# Patient Record
Sex: FEMALE | Race: WHITE | URBAN - METROPOLITAN AREA
[De-identification: names, ages, dates, MRNs, and addresses within clinical notes are randomized per-mention and may not be internally consistent; named-entity substitution may affect disease eponyms.]

---

## 2023-04-25 ENCOUNTER — TELEPHONE (OUTPATIENT)
Dept: CARDIOLOGY CLINIC | Age: 68
End: 2023-04-25

## 2023-04-25 DIAGNOSIS — I35.1 NONRHEUMATIC AORTIC (VALVE) INSUFFICIENCY: Primary | ICD-10-CM

## 2023-04-25 NOTE — TELEPHONE ENCOUNTER
Patient states she has an annual appt scheduled for 5/25. Patient states this was the year to have an Echo. Patient states while at her internist, he picked up something in one of her Carotids. Please call and advise patient when the orders are in.

## 2023-05-31 NOTE — PROGRESS NOTES
[address] [address] [address], [address]  PHONE: [phone]      23    NAME:  Ann Ackerman  : 1955  MRN: [id number]         SUBJECTIVE:   Ann Ackerman is a 70 y.o. female seen for a follow up visit regarding the following:     Chief Complaint   Patient presents with    Follow-up    Hypertension            HPI:  Follow up  Follow-up and Hypertension   . Follow up AI, bradycardia, hypertension, dyslipidemia and statin intolerance. Kindly brought her labs which appear below, LDL remains 160. She exercises daily, a little short of breath up inclines but not enough that it has caused concern at this point, no chest tightness, dizziness, near syncope or edema. 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 Aortic root 4.0cm (increased from 3.7 on previous study) Mild MR and TR. RVSP 31. 2014 : normal V size, EF 50-55%. Mild AI, aortic root 3.9, mild MR and TR, RVSP 32    2015 : LVEDS 38 LVEDD 56 EF 58%, moderate AI, mild MR, RVSP 36    Aug 2016 - EF 65%, mod to severe AI by report - reviewed images, P1/2 time 599, normal chamber size (49 and 34 mm), moderate    2017 - EF 65-70%, mild to mod AI, ascending aorta 4.3 cm, mild MR    2018 - EF 60%, LVEDD 59, moderate AI, ascending aorta 3.7    2019 - no change   2020- EF 60%, abnormal DF, mod AI, ascending aorta 4.1 cm  May 2023       EF 60-65%, mild LVE, normal DF, Mod AI, mild AS, mean gradient 13, peak velocity 2.5 m/s, SVI 67, mild MR, RVSP 23, aortic root 4.3, ascending Ao 3.8, DTA 2.8         Slow pulse, rhythm disorder, bradycardia, SVT   7/3/13 : Holter with sinus bradycardia, average heart rate 48, rates in the 30s during sleep with 2 second pauses, asymptomatic                   Key CAD CHF Meds            Bempedoic Acid 180 MG TABS (Taking)    Sig - Route:  Take 180 mg by mouth daily -

## 2023-06-01 ENCOUNTER — OFFICE VISIT (OUTPATIENT)
Age: 68
End: 2023-06-01

## 2023-06-01 VITALS
HEART RATE: 50 BPM | HEIGHT: 64 IN | BODY MASS INDEX: 27.14 KG/M2 | WEIGHT: 159 LBS | DIASTOLIC BLOOD PRESSURE: 72 MMHG | SYSTOLIC BLOOD PRESSURE: 130 MMHG

## 2023-06-01 DIAGNOSIS — R00.1 BRADYCARDIA: ICD-10-CM

## 2023-06-01 DIAGNOSIS — I10 ESSENTIAL HYPERTENSION: Primary | ICD-10-CM

## 2023-06-01 DIAGNOSIS — I35.1 NONRHEUMATIC AORTIC VALVE INSUFFICIENCY: ICD-10-CM

## 2023-06-01 DIAGNOSIS — E78.2 MIXED HYPERLIPIDEMIA: ICD-10-CM

## 2023-06-01 DIAGNOSIS — R09.89 BILATERAL CAROTID BRUITS: ICD-10-CM

## 2023-06-01 RX ORDER — OMEPRAZOLE 40 MG/1
CAPSULE, DELAYED RELEASE ORAL DAILY
COMMUNITY
Start: 2023-05-28

## 2023-06-01 RX ORDER — CHLORTHALIDONE 25 MG/1
25 TABLET ORAL DAILY
Qty: 30 TABLET | Status: CANCELLED | OUTPATIENT
Start: 2023-06-01

## 2023-06-01 ASSESSMENT — ENCOUNTER SYMPTOMS: SHORTNESS OF BREATH: 0

## 2023-06-07 ENCOUNTER — PATIENT MESSAGE (OUTPATIENT)
Age: 68
End: 2023-06-07

## 2023-06-07 NOTE — TELEPHONE ENCOUNTER
From: Lucia Show  To: Dr. Claudean Carry: 6/7/2023 7:07 AM EDT  Subject: RX    Good morning,  Can you please send in the RX for bempedoic acid as discussed with Dr. Radha Alvarado on my recent visit. I checked with my pharmacy and the only way they can tell if insurance will cover it is if they have a prescription for it. Thank you very much.   [address] [place] [address] [phone]

## 2023-06-08 RX ORDER — LISINOPRIL 20 MG/1
20 TABLET ORAL DAILY
Qty: 90 TABLET | Refills: 3 | Status: SHIPPED | OUTPATIENT
Start: 2023-06-08

## 2023-06-08 RX ORDER — CHLORTHALIDONE 25 MG/1
25 TABLET ORAL DAILY
Qty: 90 TABLET | Refills: 3 | Status: SHIPPED | OUTPATIENT
Start: 2023-06-08

## 2023-06-19 ENCOUNTER — TELEPHONE (OUTPATIENT)
Age: 68
End: 2023-06-19

## 2023-06-20 NOTE — TELEPHONE ENCOUNTER
Correct directions faxed to local pharmacy. I tried to call but the pharmacy is not open yet.    Requested Prescriptions     Signed Prescriptions Disp Refills    Evolocumab 140 MG/ML SOAJ 6 Adjustable Dose Pre-filled Pen Syringe 3     Sig: Inject 1 mL into the skin every 14 days     Authorizing Provider: Deisi Jackson     Ordering User: Tyrel Najera

## 2024-02-25 ENCOUNTER — PATIENT MESSAGE (OUTPATIENT)
Age: 69
End: 2024-02-25

## 2024-02-26 ENCOUNTER — TELEPHONE (OUTPATIENT)
Age: 69
End: 2024-02-26

## 2024-02-26 NOTE — TELEPHONE ENCOUNTER
Requested Prescriptions     Pending Prescriptions Disp Refills    Evolocumab 140 MG/ML SOAJ 6 Adjustable Dose Pre-filled Pen Syringe 3     Sig: Inject 1 mL into the skin every 14 days

## 2024-02-27 NOTE — TELEPHONE ENCOUNTER
PA for Repatha submitted and approved through February 26, 2025.     [facility] (Key: [id number])  Rx #: [id number]  Repatha SureClick 140MG/ML auto-injectors

## 2024-06-10 NOTE — PROGRESS NOTES
[facility] CARDIOLOGY  [address] [place] [address], [address]  [place] [address]  PHONE: [phone]      24    NAME:  Mandeep Wick  : 1955  MRN: [id number]         SUBJECTIVE:   Mandeep Wick is a 69 y.o. female seen for a follow up visit regarding the following:     Chief Complaint   Patient presents with    1 Year Follow Up    Hypertension            HPI:  Follow up  1 Year Follow Up and Hypertension   .    Follow up AI, bradycardia, hypertension, dyslipidemia and statin intolerance.  She's felt well, denies cp, dyspnea.  Not walking as much as she had been because her  has been ill, so she's still very active, she's now responsible for everything at home including all the yard work.  (He has heart disease with prior surgery).          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 Aortic root 4.0cm (increased from 3.7 on previous study) Mild MR and TR. RVSP 31.    2014 : normal V size, EF 50-55%. Mild AI, aortic root 3.9, mild MR and TR, RVSP 32    2015 : LVEDS 38 LVEDD 56 EF 58%, moderate AI, mild MR, RVSP 36    Aug 2016 - EF 65%, mod to severe AI by report - reviewed images, P1/2 time 599, normal chamber size (49 and 34 mm), moderate    2017 - EF 65-70%, mild to mod AI, ascending aorta 4.3 cm, mild MR    2018 - EF 60%, LVEDD 59, moderate AI, ascending aorta 3.7    2019 - no change   2020- EF 60%, abnormal DF, mod AI, ascending aorta 4.1 cm  May 2023       EF 60-65%, mild LVE, normal DF, Mod AI, mild AS, mean gradient 13, peak velocity 2.5 m/s, SVI 67, mild MR, RVSP 23, aortic root 4.3, ascending Ao 3.8, DTA 2.8         Slow pulse, rhythm disorder, bradycardia, SVT   7/3/13 : Holter with sinus bradycardia, average heart rate 48, rates in the 30s during sleep with 2 second pauses, asymptomatic                  Cardiac Medications       ACE Inhibitors       lisinopril (PRINIVIL;ZESTRIL) 20

## 2024-06-11 ENCOUNTER — OFFICE VISIT (OUTPATIENT)
Age: 69
End: 2024-06-11
Payer: MEDICARE

## 2024-06-11 VITALS
DIASTOLIC BLOOD PRESSURE: 90 MMHG | SYSTOLIC BLOOD PRESSURE: 138 MMHG | WEIGHT: 168 LBS | BODY MASS INDEX: 28.68 KG/M2 | HEIGHT: 64 IN | HEART RATE: 47 BPM

## 2024-06-11 DIAGNOSIS — R00.1 BRADYCARDIA: ICD-10-CM

## 2024-06-11 DIAGNOSIS — I10 ESSENTIAL HYPERTENSION: Primary | ICD-10-CM

## 2024-06-11 DIAGNOSIS — I06.1 RHEUMATIC AORTIC VALVE INSUFFICIENCY: ICD-10-CM

## 2024-06-11 DIAGNOSIS — E78.2 MIXED HYPERLIPIDEMIA: ICD-10-CM

## 2024-06-11 PROCEDURE — G8419 CALC BMI OUT NRM PARAM NOF/U: HCPCS | Performed by: INTERNAL MEDICINE

## 2024-06-11 PROCEDURE — 1036F TOBACCO NON-USER: CPT | Performed by: INTERNAL MEDICINE

## 2024-06-11 PROCEDURE — 3079F DIAST BP 80-89 MM HG: CPT | Performed by: INTERNAL MEDICINE

## 2024-06-11 PROCEDURE — 3075F SYST BP GE 130 - 139MM HG: CPT | Performed by: INTERNAL MEDICINE

## 2024-06-11 PROCEDURE — 99214 OFFICE O/P EST MOD 30 MIN: CPT | Performed by: INTERNAL MEDICINE

## 2024-06-11 PROCEDURE — 1123F ACP DISCUSS/DSCN MKR DOCD: CPT | Performed by: INTERNAL MEDICINE

## 2024-06-11 PROCEDURE — 93000 ELECTROCARDIOGRAM COMPLETE: CPT | Performed by: INTERNAL MEDICINE

## 2024-06-11 PROCEDURE — 3017F COLORECTAL CA SCREEN DOC REV: CPT | Performed by: INTERNAL MEDICINE

## 2024-06-11 PROCEDURE — G8400 PT W/DXA NO RESULTS DOC: HCPCS | Performed by: INTERNAL MEDICINE

## 2024-06-11 PROCEDURE — G8427 DOCREV CUR MEDS BY ELIG CLIN: HCPCS | Performed by: INTERNAL MEDICINE

## 2024-06-11 PROCEDURE — 1090F PRES/ABSN URINE INCON ASSESS: CPT | Performed by: INTERNAL MEDICINE

## 2024-06-11 RX ORDER — LISINOPRIL 20 MG/1
20 TABLET ORAL DAILY
Qty: 90 TABLET | Refills: 3 | Status: SHIPPED | OUTPATIENT
Start: 2024-06-11

## 2024-06-11 RX ORDER — CHLORTHALIDONE 25 MG/1
25 TABLET ORAL DAILY
Qty: 90 TABLET | Refills: 3 | Status: SHIPPED | OUTPATIENT
Start: 2024-06-11

## 2024-06-11 ASSESSMENT — ENCOUNTER SYMPTOMS: SHORTNESS OF BREATH: 0

## 2024-06-11 NOTE — PATIENT INSTRUCTIONS
Patient Education        Learning About the Mediterranean Diet  What is the Mediterranean diet?     The Mediterranean diet is a style of eating rather than a diet plan. It features foods eaten in [place], [place], southern [place], and other countries along the [place] Sea. It emphasizes eating foods like fish, fruits, vegetables, beans, high-fiber breads and whole grains, nuts, and olive oil. This style of eating includes limited red meat, cheese, and sweets.  Why choose the Mediterranean diet?  A Mediterranean-style diet may improve heart health. It contains more fat than other heart-healthy diets. But the fats are mainly from nuts, unsaturated oils (such as fish oils and olive oil), and certain nut or seed oils (such as canola, soybean, or flaxseed oil). These fats may help protect the heart and blood vessels.  How can you get started on the Mediterranean diet?  Here are some things you can do to switch to a more Mediterranean way of eating.  What to eat  Eat a variety of fruits and vegetables each day, such as grapes, blueberries, tomatoes, broccoli, peppers, figs, olives, spinach, eggplant, beans, lentils, and chickpeas.  Eat a variety of whole-grain foods each day, such as oats, brown rice, and whole wheat bread, pasta, and couscous.  Eat fish at least 2 times a week. Try tuna, salmon, mackerel, lake trout, herring, or sardines.  Eat moderate amounts of low-fat dairy products, such as milk, cheese, or yogurt.  Eat moderate amounts of poultry and eggs.  Choose healthy (unsaturated) fats, such as nuts, olive oil, and certain nut or seed oils like canola, soybean, and flaxseed.  Limit unhealthy (saturated) fats, such as butter, palm oil, and coconut oil. And limit fats found in animal products, such as meat and dairy products made with whole milk. Try to eat red meat only a few times a month in very small amounts.  Limit sweets and desserts to only a few times a week. This includes sugar-sweetened

## 2024-12-17 NOTE — PROGRESS NOTES
García, MD   CALCIUM PO Take 1,200 mg by mouth 2 times daily  Patient not taking: Reported on 6/1/2023    Automatic Reconciliation, Ar   alendronate (FOSAMAX) 70 MG tablet Take 70 mg by mouth every 7 days  Patient not taking: Reported on 6/1/2023    Automatic Reconciliation, Ar   colesevelam (WELCHOL) 625 MG tablet TAKE 3 TABS BY MOUTH TWO (2) TIMES DAILY (WITH MEALS).  Patient not taking: Reported on 6/11/2024 3/14/22   Automatic Reconciliation, Ar   cyanocobalamin 1000 MCG tablet Take 1 tablet by mouth daily  Patient not taking: Reported on 6/11/2024    Automatic Reconciliation, Ar   latanoprost (XALATAN) 0.005 % ophthalmic solution Apply 1 drop to eye  Patient not taking: Reported on 6/1/2023    Automatic Reconciliation, Ar     Allergies   Allergen Reactions    Acetaminophen-Codeine     Nitrofurantoin     Statins Other (See Comments)     SEVERELY ELEVATED TRANSAMINASES    Tramadol Other (See Comments)    Zetia [Ezetimibe]      GI effects, weight gain     Past Medical History:   Diagnosis Date    Aortic valve insufficiency 7/7/2016    Remains moderate by echo March 2011 with normal LV size and function, stable 2012 echo 3/11/13 : Normal LV size and function, Moderate AI,. Aortic root 4.0cm (increased from 3.7 on previous study) Mild MR and TR. RVSP 31. March 2014 : normal V size, EF 50-55%. Mild AI, aortic root 3.9, mild MR and TR, RVSP 32 March 2015 : LVEDS 38 LVEDD 56 EF 58%, moderate AI, mild MR, RVSP 36    Bradycardia 7/7/2016    Slow pulse, rhythm disorder, bradycardia, SVT    Essential hypertension 7/7/2016    Reading in target range    Hyperlipidemia 7/7/2016    Statin intolerant due to markedly elevated LFTs     History reviewed. No pertinent surgical history.  History reviewed. No pertinent family history.  Social History     Tobacco Use    Smoking status: Never    Smokeless tobacco: Never   Substance Use Topics    Alcohol use: Not on file       ROS:    Review of Systems   Cardiovascular:  Negative

## 2024-12-18 ENCOUNTER — OFFICE VISIT (OUTPATIENT)
Age: 69
End: 2024-12-18
Payer: MEDICARE

## 2024-12-18 VITALS
HEIGHT: 64 IN | HEART RATE: 66 BPM | WEIGHT: 170 LBS | DIASTOLIC BLOOD PRESSURE: 60 MMHG | SYSTOLIC BLOOD PRESSURE: 132 MMHG | BODY MASS INDEX: 29.02 KG/M2

## 2024-12-18 DIAGNOSIS — I77.89 ENLARGED THORACIC AORTA (HCC): ICD-10-CM

## 2024-12-18 DIAGNOSIS — R00.1 BRADYCARDIA: ICD-10-CM

## 2024-12-18 DIAGNOSIS — I06.1 RHEUMATIC AORTIC VALVE INSUFFICIENCY: Primary | ICD-10-CM

## 2024-12-18 DIAGNOSIS — I10 ESSENTIAL HYPERTENSION: ICD-10-CM

## 2024-12-18 DIAGNOSIS — I06.0 RHEUMATIC AORTIC STENOSIS: ICD-10-CM

## 2024-12-18 PROCEDURE — 3017F COLORECTAL CA SCREEN DOC REV: CPT | Performed by: INTERNAL MEDICINE

## 2024-12-18 PROCEDURE — 1159F MED LIST DOCD IN RCRD: CPT | Performed by: INTERNAL MEDICINE

## 2024-12-18 PROCEDURE — G8484 FLU IMMUNIZE NO ADMIN: HCPCS | Performed by: INTERNAL MEDICINE

## 2024-12-18 PROCEDURE — 1090F PRES/ABSN URINE INCON ASSESS: CPT | Performed by: INTERNAL MEDICINE

## 2024-12-18 PROCEDURE — 99214 OFFICE O/P EST MOD 30 MIN: CPT | Performed by: INTERNAL MEDICINE

## 2024-12-18 PROCEDURE — G8419 CALC BMI OUT NRM PARAM NOF/U: HCPCS | Performed by: INTERNAL MEDICINE

## 2024-12-18 PROCEDURE — G8427 DOCREV CUR MEDS BY ELIG CLIN: HCPCS | Performed by: INTERNAL MEDICINE

## 2024-12-18 PROCEDURE — 1160F RVW MEDS BY RX/DR IN RCRD: CPT | Performed by: INTERNAL MEDICINE

## 2024-12-18 PROCEDURE — 3078F DIAST BP <80 MM HG: CPT | Performed by: INTERNAL MEDICINE

## 2024-12-18 PROCEDURE — G8400 PT W/DXA NO RESULTS DOC: HCPCS | Performed by: INTERNAL MEDICINE

## 2024-12-18 PROCEDURE — 1126F AMNT PAIN NOTED NONE PRSNT: CPT | Performed by: INTERNAL MEDICINE

## 2024-12-18 PROCEDURE — 1036F TOBACCO NON-USER: CPT | Performed by: INTERNAL MEDICINE

## 2024-12-18 PROCEDURE — 1123F ACP DISCUSS/DSCN MKR DOCD: CPT | Performed by: INTERNAL MEDICINE

## 2024-12-18 PROCEDURE — 3075F SYST BP GE 130 - 139MM HG: CPT | Performed by: INTERNAL MEDICINE

## 2024-12-18 RX ORDER — ACETAMINOPHEN 160 MG
2000 TABLET,DISINTEGRATING ORAL DAILY
COMMUNITY

## 2024-12-18 ASSESSMENT — ENCOUNTER SYMPTOMS: SHORTNESS OF BREATH: 1

## 2025-02-02 ENCOUNTER — PATIENT MESSAGE (OUTPATIENT)
Age: 70
End: 2025-02-02

## 2025-02-03 NOTE — TELEPHONE ENCOUNTER
Requested Prescriptions     Pending Prescriptions Disp Refills    Evolocumab 140 MG/ML SOAJ 6 Adjustable Dose Pre-filled Pen Syringe 3     Sig: Inject 140 mg into the skin every 14 days     Rx verified last ov 12/18/24

## 2025-04-14 ENCOUNTER — PATIENT MESSAGE (OUTPATIENT)
Age: 70
End: 2025-04-14

## 2025-08-06 RX ORDER — LISINOPRIL 20 MG/1
TABLET ORAL
Qty: 90 TABLET | Refills: 1 | Status: SHIPPED | OUTPATIENT
Start: 2025-08-06